# Patient Record
(demographics unavailable — no encounter records)

---

## 2025-03-06 NOTE — DISCUSSION/SUMMARY
[FreeTextEntry1] : 15 y.o female presents to health center with small itchy hive to left cheek  V/S stable Denies additional complaints  Hydrocortisone cream applied to area  Spoke with mom  Loratadine PO given, tolerated  Answered all questions  Feels well to return to class  D/C to class

## 2025-03-06 NOTE — HISTORY OF PRESENT ILLNESS
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

## 2025-03-06 NOTE — PHYSICAL EXAM
[Regular Rate and Rhythm] : regular rate and rhythm [Normal S1, S2 audible] : normal S1, S2 audible [Murmur] : no murmur [Tachycardia] : no tachycardia [NL] : warm, clear